# Patient Record
Sex: MALE | Race: WHITE | Employment: OTHER | URBAN - METROPOLITAN AREA
[De-identification: names, ages, dates, MRNs, and addresses within clinical notes are randomized per-mention and may not be internally consistent; named-entity substitution may affect disease eponyms.]

---

## 2021-01-26 ENCOUNTER — HOSPITAL ENCOUNTER (OUTPATIENT)
Dept: PHYSICAL THERAPY | Age: 76
Discharge: HOME | End: 2021-01-26
Payer: MEDICARE

## 2021-01-26 PROCEDURE — 97162 PT EVAL MOD COMPLEX 30 MIN: CPT

## 2021-01-26 PROCEDURE — 97535 SELF CARE MNGMENT TRAINING: CPT

## 2021-01-26 PROCEDURE — 97140 MANUAL THERAPY 1/> REGIONS: CPT

## 2021-01-26 NOTE — PROGRESS NOTES
PHYSICAL THERAPY - DAILY TREATMENT NOTE    Patient Name: Leonardo Webb        Date: 2021  : 1945   YES Patient  Verified  Visit #:     Insurance: Payor: Praveena Escobedo / Plan: [place] MEDICARE PART A & B / Product Type: Medicare /      In time: 2:50 Out time: 3:35   Total Treatment Time: 45     Medicare Time Tracking (below)   Total Timed Codes (min):  25 1:1 Treatment Time:  25     TREATMENT AREA =  Low back pain [M54.5]    SUBJECTIVE  Pain Level (on 0 to 10 scale):    Medication Changes/New allergies or changes in medical history, any new surgeries or procedures? NO    If yes, update Summary List   Subjective Functional Status/Changes:  []  No changes reported     SEE IE          OBJECTIVE    15 min Manual Therapy: T/S mob, L4 L FRS shivani, DTM para   Rationale:      decrease pain, increase ROM and increase tissue extensibility to improve patient's ability to perform ADLs    10 min Self Care: Prone on elbow chin tuck, t/s Rot, seated t/s ext   Rationale:    increase ROM, increase strength and improve coordination to improve the patients ability to perform ADls      Billed With/As:   [] TE   [] TA   [] Neuro   [x] Self Care Patient Education: [x] Review HEP    [] Progressed/Changed HEP based on:   [] positioning   [] body mechanics   [] transfers   [] heat/ice application    [] other:       min Patient Education:  YES  Reviewed HEP   []  Progressed/Changed HEP based on:         Other Objective/Functional Measures:    SEE IE     Post Treatment Pain Level (on 0 to 10) scale:       ASSESSMENT  Assessment/Changes in Function:     SEE IE     []  See Progress Note/Recertification   Patient will continue to benefit from skilled PT services to modify and progress therapeutic interventions, address functional mobility deficits, address ROM deficits, address strength deficits, analyze and address soft tissue restrictions, analyze and cue movement patterns, analyze and modify body mechanics/ergonomics and assess and modify postural abnormalities to attain remaining goals. Progress toward goals / Updated goals:         PLAN  []  Upgrade activities as tolerated YES Continue plan of care   []  Discharge due to :    []  Other:      Therapist: Ethan Lambert PT, OCS, SCS, CSCS    Date: 1/26/2021 Time: 3:39 PM       No future appointments.

## 2021-01-26 NOTE — PROGRESS NOTES
[facility] PHYSICAL THERAPY  [address],[place], [address] [facility] - Phone: [phone]  Fax: [phone] / [address]  Patient Name: Susie Brooke : 1945   Medical   Diagnosis: Low back pain [M54.5] Treatment Diagnosis: Low back pain [M54.5]   Onset Date: Chronic      Referral Source: Shayan Bañuelos MD Start of Care [facility]): 2021   Prior Hospitalization: See medical history Provider #: [id number]   Prior Level of Function: Pain free golf swing   Comorbidities: CA, DM   Medications: Verified on Patient Summary List   The Plan of Care and following information is based on the information from the initial evaluation.   ===========================================================================================  Assessment / schmitz information:  Susie Brooke is a 70 y.o.  yo male with Dx of Low back pain [M54.5]. He reports chronic Hx of LBP. He currently rates his pain as 8/10 at worst, 2/10 at best, primarily located at R lower lumbar region. He complains of difficulty and increase pain with back swing of his golf swing. Objective Findings:  Lumbar ROM: Flx  = limited by 20%, Ext = limited by 30%, Rot: R = limited by 60%, L = limited by 50%. Manual Muscle Testing: R hip abd is Reduced. Palpation:  L4 L FRS, B iliosacral ant innominate, limite mobility at t6 and 6-7th rib noted. Pt instructed in HEP and will f/u in clinic for PT.  ===========================================================================================  Eval Complexity: History MEDIUM  Complexity : 1-2 comorbidities / personal factors will impact the outcome/ POC ;  Examination  MEDIUM Complexity : 3 Standardized tests and measures addressing body structure, function, activity limitation and / or participation in recreation ; Presentation MEDIUM Complexity : Evolving with changing characteristics ;   Decision Making MEDIUM Complexity : FOTO score of 26-74; Overall Complexity MEDIUM  Problem List: pain affecting function, decrease ROM, decrease strength, impaired gait/ balance, decrease ADL/ functional abilitiies, decrease activity tolerance and decrease flexibility/ joint mobility   Treatment Plan may include any combination of the following: Therapeutic exercise, Therapeutic activities, Neuromuscular re-education, Physical agent/modality, Manual therapy, Patient education, Self Care training and Functional mobility training  Patient / Family readiness to learn indicated by: asking questions  Persons(s) to be included in education: patient (P)  Barriers to Learning/Limitations: None  Measures taken, if barriers to learning:    Patient Goal (s): Decrease pain    Patient self reported health status: good  Rehabilitation Potential: good     Short Term Goals: To be accomplished in  1-2  weeks:  1. Independent with HEP. 2. Decrease max pain 25-50% to assist with playing golf    Long Term Goals: To be accomplished in  3-4  weeks:  1. Decrease max pain 50-75% to assist with playing golf  2. Increase FOTO score to 72% to show functional improvment. 3.  Will rate  >/= +5 on Global Rating of Change and be prepared to DC to HEP. Frequency / Duration:   Patient to be seen  2-3  times per week for 3-4  weeks:  Patient / Caregiver education and instruction: self care and exercises    Therapist Signature: Haim Aguila DPT, OCS, SCS, CSCS Date: 6/60/9575   Certification Period: 1/26/21 - 4/23/21 Time: 3:41 PM   ===========================================================================================  I certify that the above Physical Therapy Services are being furnished while the patient is under my care. I agree with the treatment plan and certify that this therapy is necessary. Physician Signature:        Date:       Time:     Please sign and return to [facility] or you may fax the signed copy to [phone]. Thank you.

## 2021-02-05 ENCOUNTER — HOSPITAL ENCOUNTER (OUTPATIENT)
Dept: PHYSICAL THERAPY | Age: 76
Discharge: HOME | End: 2021-02-05
Payer: MEDICARE

## 2021-02-05 PROCEDURE — 97140 MANUAL THERAPY 1/> REGIONS: CPT

## 2021-02-05 NOTE — PROGRESS NOTES
PHYSICAL THERAPY - DAILY TREATMENT NOTE    Patient Name: Kelly Barry        Date: 2021  : 1945   yes Patient  Verified  Visit #:   2   of   12  Insurance: Payor: Deann Hunter / Plan: [place] MEDICARE PART A & B / Product Type: Medicare /      In time: 9:15 Out time: 10:00   Total Treatment Time: 45     Medicare/BCBS Time Tracking (below)   Total Timed Codes (min):  45 1:1 Treatment Time:  45     TREATMENT AREA =  Low back pain [M54.5]  SUBJECTIVE  Pain Level (on 0 to 10 scale):  0  / 10   Medication Changes/New allergies or changes in medical history, any new surgeries or procedures? yes  If yes, update Summary List   Subjective Functional Status/Changes:  []  No changes reported     Its ok today. I felt like I gained the ROM but it didn't stay. I did HEP 2-3x/wk          OBJECTIVE  5 min Therapeutic Exercise:  [x]  See flow sheet   Rationale:      increase ROM, increase strength and improve coordination to improve the patients ability to perform ADLs     40 min Manual Therapy: T/S mob, L4 L FRS shivani, DTM para   Rationale:      decrease pain, increase ROM and increase tissue extensibility to improve patient's ability to perform ADLs     The manual therapy interventions were performed at a separate and distinct time from the therapeutic activities interventions.       Billed With/As:   [] TE   [] TA   [] Neuro   [] Self Care Patient Education: [x] Review HEP    [] Progressed/Changed HEP based on:   [] positioning   [] body mechanics   [] transfers   [] heat/ice application    [] other:      Other Objective/Functional Measures:    Reemphasized the importance of perform HEP daily     Post Treatment Pain Level (on 0 to 10) scale:   0  / 10     ASSESSMENT  Assessment/Changes in Function:     Symmetrical Rot ROM noted after today's Rx     []  See Progress Note/Recertification   Patient will continue to benefit from skilled PT services to modify and progress therapeutic interventions, address functional mobility deficits and address ROM deficits to attain remaining goals. Progress toward goals / Updated goals:     Independent with HEP     PLAN  [x]  Upgrade activities as tolerated yes Continue plan of care   []  Discharge due to :    []  Other:      Therapist: Brayan Garcia, PT    Date: 2/5/2021 Time: 6:50 AM     Future Appointments   Date Time Provider Sandro Edgar   2/5/2021  9:15 AM Teresa Beckwith, PT [id number]

## 2021-02-19 ENCOUNTER — HOSPITAL ENCOUNTER (OUTPATIENT)
Dept: PHYSICAL THERAPY | Age: 76
Discharge: HOME | End: 2021-02-19
Payer: MEDICARE

## 2021-02-19 PROCEDURE — 97140 MANUAL THERAPY 1/> REGIONS: CPT

## 2021-02-19 NOTE — PROGRESS NOTES
PHYSICAL THERAPY - DAILY TREATMENT NOTE    Patient Name: Dany Knight        Date: 2021  : 1945   yes Patient  Verified  Visit #:   3   of   12  Insurance: Payor: Tanya Cordero / Plan: [place] MEDICARE PART A & B / Product Type: Medicare /      In time: 3:00 Out time: 3:45   Total Treatment Time: 45     Medicare/BCBS Time Tracking (below)   Total Timed Codes (min):  45 1:1 Treatment Time:  45     TREATMENT AREA =  Low back pain [M54.5]  SUBJECTIVE  Pain Level (on 0 to 10 scale):  0  / 10   Medication Changes/New allergies or changes in medical history, any new surgeries or procedures?    no  If yes, update Summary List   Subjective Functional Status/Changes:  []  No changes reported     No new c/o          OBJECTIVE  5 min Therapeutic Exercise:  [x]? See flow sheet   Rationale:      increase ROM, increase strength and improve coordination to improve the patients ability to perform ADLs      40 min Manual Therapy: T/S mob, L4 L FRS shivani, DTM para   Rationale:      decrease pain, increase ROM and increase tissue extensibility to improve patient's ability to perform ADLs     The manual therapy interventions were performed at a separate and distinct time from the therapeutic activities interventions.       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    Progress toward goals / Updated goals:    Slow progress with mobility      PLAN  [x]  Upgrade activities as tolerated yes Continue plan of care   []  Discharge due to :    []  Other:      Therapist: Bibi Leonard PT    Date: 2/19/2021 Time: 6:54 AM     Future Appointments   Date Time Provider Sandro Edgar   2/19/2021  3:00 PM Erika Reed, PT [facility]   2/26/2021  3:00 PM Erika Reed, PT [facility]   3/10/2021 10:45 AM Erika Reed, PT [address]   3/12/2021 10:00 AM Erika Reed, PT [address]   3/15/2021 10:00 AM Erika Reed, PT [address]   3/17/2021 10:00 AM Erika Reed PT [address]   3/22/2021 10:00 AM Erika Reed, PT [facility]   3/24/2021 10:00 AM Erika Reed, PT [address]

## 2021-02-26 ENCOUNTER — HOSPITAL ENCOUNTER (OUTPATIENT)
Dept: PHYSICAL THERAPY | Age: 76
Discharge: HOME | End: 2021-02-26
Payer: MEDICARE

## 2021-02-26 PROCEDURE — 97110 THERAPEUTIC EXERCISES: CPT

## 2021-02-26 PROCEDURE — 97140 MANUAL THERAPY 1/> REGIONS: CPT

## 2021-02-26 NOTE — PROGRESS NOTES
PHYSICAL THERAPY - DAILY TREATMENT NOTE    Patient Name: Johanna Simpson        Date: 2021  : 1945   yes Patient  Verified  Visit #:   4   of   12  Insurance: Payor: Javed Worthington / Plan: [place] MEDICARE PART A & B / Product Type: Medicare /      In time: 3:00 Out time: 3:45   Total Treatment Time: 45     Medicare/BCBS Time Tracking (below)   Total Timed Codes (min):  45 1:1 Treatment Time:  45     TREATMENT AREA =  Low back pain [M54.5]  SUBJECTIVE  Pain Level (on 0 to 10 scale):  0  / 10   Medication Changes/New allergies or changes in medical history, any new surgeries or procedures?    no  If yes, update Summary List   Subjective Functional Status/Changes:  []  No changes reported     I played golf last week. I feel like my mobility is a little better, but my back still hurt after           OBJECTIVE  10 min Therapeutic Exercise:  [x]? ?  See flow sheet   Rationale:      increase ROM, increase strength and improve coordination to improve the patients ability to perform ADLs      35 min Manual Therapy: T/S mob, L4 L FRS shivani, DTM para,L IS antinn shivani   Rationale:      decrease pain, increase ROM and increase tissue extensibility to improve patient's ability to perform ADLs     The manual therapy interventions were performed at a separate and distinct time from the therapeutic activities interventions.       Billed With/As:   [] TE   [] TA   [] Neuro   [] Self Care Patient Education: [x] Review HEP    [] Progressed/Changed HEP based on:   [] positioning   [] body mechanics   [] transfers   [] heat/ice application    [] other:      Other Objective/Functional Measures:    B Ant inn noted (L>R)     Post Treatment Pain Level (on 0 to 10) scale:   0  / 10     ASSESSMENT  Assessment/Changes in Function:     Difficulty loading R hip with TK MB toss     []  See Progress Note/Recertification   Patient will continue to benefit from skilled PT services to modify and progress therapeutic interventions, address functional mobility deficits and address ROM deficits to attain remaining goals.    Progress toward goals / Updated goals:    Progressing slowly with pain reduciton      PLAN  [x]  Upgrade activities as tolerated yes Continue plan of care   []  Discharge due to :    []  Other:      Therapist: Lex Carvalho PT    Date: 2/26/2021 Time: 4:01 PM     Future Appointments   Date Time Provider Sandro Edgar   3/10/2021 10:45 AM Judith Sousa, PT [address]   3/12/2021 10:00 AM Judith Sousa, PT [address]   3/15/2021 10:00 AM Judith Sousa, PT [address]   3/17/2021 10:00 AM Judith Sousa, PT [address]   3/22/2021 10:00 AM Judith Sousa, PT [address]   3/24/2021 10:00 AM Judith Sousa, PT [address]

## 2021-03-01 NOTE — PROGRESS NOTES
[facility] PHYSICAL THERAPY   [facility] [address],[place], [address] [facility] - Phone: [phone]  Fax: [phone] PHYSICAL THERAPY          Patient Name: Susanne Chaudhry : 1945   Treatment/Medical Diagnosis: Low back pain [M54.5]   Onset Date: chrpnic    Referral Source: Sun Gonzales MD Start of [facility]): 21   Prior Hospitalization: See Medical History Provider #: [id number]   Prior Level of Function: Pain free golf swing   Comorbidities: CA, DM   Medications: Verified on Patient Summary List   Visits from [facility]: 4 Missed Visits: 0     Goal/Measure of Progress Goal Met? 1. Decrease max pain by 50-75% to assist with ADLs   Status at last Eval: 8/10 Current Status: 5-6/10 Progressing    2. Increase FOTO score to 72% to show functional improvment   Status at last Eval: 67 Current Status: na n/a   3. Will rate >/= +5 on Global Rating of Change and be prepared to DC to HEP. Status at last Eval: na Current Status: na n/a     Key Functional Changes/Progress: Mr. Greer Stewart has had a good tolerance to PT treatment. He  Has only attended 3 follow up visits in 4 wks. He demonstrates some improvement with his thoracic mobility and now has symmetrical thoracic rotation. However, he continues to present with kyphotic posture and anterior innominate of L pelvis, and complain of pain at the end range of back swing when playing golf.     Problem List: pain affecting function, decrease ROM, decrease strength, impaired gait/ balance, decrease ADL/ functional abilitiies, decrease activity tolerance and decrease flexibility/ joint mobility   Treatment Plan may include any combination of the following: Therapeutic exercise, Therapeutic activities, Neuromuscular re-education, Physical agent/modality, Manual therapy, Patient education, Self Care training and Functional mobility training  Patient Goal(s) has been updated and includes:      Goals for this certification period include and are to be achieved in   4  weeks:  Cont with all goals above  Frequency / Duration:   Patient to be seen   2   times per week for   4    weeks:    Assessments/Recommendations: cont with POC    If you have any questions/comments please contact us directly at [phone]. Thank you for allowing us to assist in the care of your patient. Therapist Signature: Joey Bryan PT Date: 0/62/1544   Certification Period:  Reporting Period: 1/26/21 - 4/23/21 1/26/21 - 2/26/21 Time: 6:59 AM   NOTE TO PHYSICIAN:  PLEASE COMPLETE THE ORDERS BELOW AND FAX TO   [facility]: [phone]  If you are unable to process this request in 24 hours please contact our office: [phone]    ___ I have read the above report and request that my patient continue as recommended.   ___ I have read the above report and request that my patient continue therapy with the following changes/special instructions: ________________________________________________   ___ I have read the above report and request that my patient be discharged from therapy.      Physician Signature:        Date:       Time:

## 2021-03-10 ENCOUNTER — HOSPITAL ENCOUNTER (OUTPATIENT)
Dept: PHYSICAL THERAPY | Age: 76
Discharge: HOME | End: 2021-03-10
Payer: MEDICARE

## 2021-03-10 PROCEDURE — 97110 THERAPEUTIC EXERCISES: CPT

## 2021-03-10 PROCEDURE — 97140 MANUAL THERAPY 1/> REGIONS: CPT

## 2021-03-10 NOTE — PROGRESS NOTES
PHYSICAL THERAPY - DAILY TREATMENT NOTE    Patient Name: Drew Martins        Date: 3/10/2021  : 1945   yes Patient  Verified  Visit #:      12  Insurance: Payor: Len Loomis / Plan: [place] MEDICARE PART A & B / Product Type: Medicare /      In time: 10:45 Out time: 11:40   Total Treatment Time: 55     Medicare/BCBS Time Tracking (below)   Total Timed Codes (min):  55 1:1 Treatment Time:  55     TREATMENT AREA =  Low back pain [M54.5]  SUBJECTIVE  Pain Level (on 0 to 10 scale):  0  / 10   Medication Changes/New allergies or changes in medical history, any new surgeries or procedures?    no  If yes, update Summary List   Subjective Functional Status/Changes:  []  No changes reported     I think what we are doing is working. I have less pain swinging and Im driving the ball further. OBJECTIVE  25 min Therapeutic Exercise:  [x]? ??  See flow sheet   Rationale:      increase ROM, increase strength and improve coordination to improve the patients ability to perform ADLs      30 min Manual Therapy: T/S mob, R IS ant inn shivani, DTM para,R TC mob   Rationale:      decrease pain, increase ROM and increase tissue extensibility to improve patient's ability to perform ADLs     The manual therapy interventions were performed at a separate and distinct time from the therapeutic activities interventions.       Billed With/As:   [] TE   [] TA   [] Neuro   [] Self Care Patient Education: [x] Review HEP    [] Progressed/Changed HEP based on:   [] positioning   [] body mechanics   [] transfers   [] heat/ice application    [] other:      Other Objective/Functional Measures:    Cont to have limited upper t/s mobility on R     Post Treatment Pain Level (on 0 to 10) scale:   0  / 10     ASSESSMENT  Assessment/Changes in Function:     Improving loading of R hip with med ball toss     []  See Progress Note/Recertification   Patient will continue to benefit from skilled PT services to modify and progress therapeutic interventions, address functional mobility deficits and address ROM deficits to attain remaining goals.    Progress toward goals / Updated goals:    Progressing well with pain reduction      PLAN  [x]  Upgrade activities as tolerated yes Continue plan of care   []  Discharge due to :    []  Other:      Therapist: Michael Caro PT    Date: 3/10/2021 Time: 6:53 AM     Future Appointments   Date Time Provider Sandro Edgar   3/10/2021 10:45 AM Calos Jaime, PT [address]   3/12/2021 10:00 AM Calos Jaime, PT [address]   3/15/2021 10:00 AM Calos Jaime, PT [address]   3/17/2021 10:00 AM Calos Jaime, PT [address]   3/22/2021 10:00 AM Calos Jaime, PT [address]   3/24/2021 10:00 AM Calos Jiame, PT [address]

## 2021-03-12 ENCOUNTER — HOSPITAL ENCOUNTER (OUTPATIENT)
Dept: PHYSICAL THERAPY | Age: 76
Discharge: HOME | End: 2021-03-12
Payer: MEDICARE

## 2021-03-12 PROCEDURE — 97140 MANUAL THERAPY 1/> REGIONS: CPT

## 2021-03-12 PROCEDURE — 97110 THERAPEUTIC EXERCISES: CPT

## 2021-03-12 NOTE — PROGRESS NOTES
PHYSICAL THERAPY - DAILY TREATMENT NOTE    Patient Name: Enrique Johnson        Date: 3/12/2021  : 1945   yes Patient  Verified  Visit #:     Insurance: Payor: Dai Yang / Plan: [place] MEDICARE PART A & B / Product Type: Medicare /      In time: 9:55 Out time: 10:40   Total Treatment Time: 45     Medicare/BCBS Time Tracking (below)   Total Timed Codes (min):  45 1:1 Treatment Time:  45     TREATMENT AREA =  Low back pain [M54.5]  SUBJECTIVE  Pain Level (on 0 to 10 scale):  0  / 10   Medication Changes/New allergies or changes in medical history, any new surgeries or procedures?    no  If yes, update Summary List   Subjective Functional Status/Changes:  []  No changes reported     Still 6/10 at the worst after playing golf but its definitely doing better. OBJECTIVE  25 min Therapeutic Exercise:  [x]? ???  See flow sheet   Rationale:      increase ROM, increase strength and improve coordination to improve the patients ability to perform ADLs      20 min Manual Therapy: T/S mob, B IS ant inn shivani, DTM para,R TC mob, cont/relax Hip flx and abd   Rationale:      decrease pain, increase ROM and increase tissue extensibility to improve patient's ability to perform ADLs     The manual therapy interventions were performed at a separate and distinct time from the therapeutic activities interventions.         Billed With/As:   [] TE   [] TA   [] Neuro   [] Self Care Patient Education: [x] Review HEP    [] Progressed/Changed HEP based on:   [] positioning   [] body mechanics   [] transfers   [] heat/ice application    [] other:      Other Objective/Functional Measures:    FOTO = 69  GROC = +6     Post Treatment Pain Level (on 0 to 10) scale:   0  / 10     ASSESSMENT  Assessment/Changes in Function:     Improved hip rot noted with MB toss  Sig difficulty with DNS start     []  See Progress Note/Recertification   Patient will continue to benefit from skilled PT services to modify and progress therapeutic interventions, address functional mobility deficits and address ROM deficits to attain remaining goals.   Progress toward goals / Updated goals:    Progressing with pain reduction and function      PLAN  [x]  Upgrade activities as tolerated yes Continue plan of care   []  Discharge due to :    []  Other:      Therapist: Nova Mack PT    Date: 3/12/2021 Time: 6:52 AM     Future Appointments   Date Time Provider Department Center   3/12/2021 10:00 AM Nova Mack PT [facility]   3/15/2021 10:00 AM Nova Mack PT [facility]   3/17/2021 10:00 AM Nova Mack PT [facility]   3/22/2021 10:00 [facility]   3/24/2021 10:00 AM [facility]

## 2021-03-15 ENCOUNTER — HOSPITAL ENCOUNTER (OUTPATIENT)
Dept: PHYSICAL THERAPY | Age: 76
Discharge: HOME | End: 2021-03-15
Payer: MEDICARE

## 2021-03-15 PROCEDURE — 97110 THERAPEUTIC EXERCISES: CPT

## 2021-03-15 PROCEDURE — 97140 MANUAL THERAPY 1/> REGIONS: CPT

## 2021-03-15 NOTE — PROGRESS NOTES
PHYSICAL THERAPY - DAILY TREATMENT NOTE    Patient Name: Leonardo Webb        Date: 3/15/2021  : 1945   yes Patient  Verified  Visit #:     Insurance: Payor: Praveena Escobedo / Plan: [place] MED[place]ARE PART A & B / Product Type: Medicare /      In time: 10:00 Out time: 10:45   Total Treatment Time: 45     Medicare/BCBS Time Tracking (below)   Total Timed Codes (min):  45 1:1 Treatment Time:  45     TREATMENT AREA =  Low back pain [M54.5]  SUBJECTIVE  Pain Level (on 0 to 10 scale):  0  / 10   Medication Changes/New allergies or changes in medical history, any new surgeries or procedures?    no  If yes, update Summary List   Subjective Functional Status/Changes:  []  No changes reported      its getting better. Im doing HEP 3x/wk          OBJECTIVE  25 min Therapeutic Exercise:  [x]? ????  See flow sheet   Rationale:      increase ROM, increase strength and improve coordination to improve the patients ability to perform ADLs      20 min Manual Therapy: T/S mob, B IS ant inn shivani, DTM para,R TC mob, cont/relax Hip flx and abd   Rationale:      decrease pain, increase ROM and increase tissue extensibility to improve patient's ability to perform ADLs     The manual therapy interventions were performed at a separate and distinct time from the therapeutic activities interventions.       Billed With/As:   [] TE   [] TA   [] Neuro   [] Self Care Patient Education: [x] Review HEP    [] Progressed/Changed HEP based on:   [] positioning   [] body mechanics   [] transfers   [] heat/ice application    [] other:      Other Objective/Functional Measures:    Discussed performing HEP 3x/Day to further improve his mobility      Post Treatment Pain Level (on 0 to 10) scale:   0  / 10     ASSESSMENT  Assessment/Changes in Function:     Improved weight shift to R hip      []  See Progress Note/Recertification   Patient will continue to benefit from skilled PT services to modify and progress therapeutic interventions, address functional mobility deficits and address ROM deficits to attain remaining goals.    Progress toward goals / Updated goals:    Progressing well with pain reduction      PLAN  [x]  Upgrade activities as tolerated yes Continue plan of care   []  Discharge due to :    []  Other:      Therapist: Ta Johnson PT    Date: 3/15/2021 Time: 6:55 AM     Future Appointments   Date Time Provider Sandro Edgar   3/15/2021 10:00 AM Vonda Redd, PT [address]   3/17/2021 10:00 AM Vonda Perryler, PT [address]   3/22/2021 10:00 AM Vonda Kindler, PT [address]   3/24/2021 10:00 AM Vonda Perryler, PT [address]   4/2/2021 10:00 AM Vonda Perryler, PT [facility]   4/6/2021  3:45 PM Vonda Rainey, PT [facility]   4/9/2021 10:00 AM Vonda Redd, PT [facility]   4/13/2021  3:45 PM Vonda Redd, PT [facility]   4/16/2021 10:00 AM Vonda Redd, PT [facility]   4/20/2021  3:45 PM Vonda Redd, PT [facility]   4/23/2021 10:00 AM Vonda Perryler, PT [facility]   4/27/2021  3:45 PM Vonda Redd, PT [facility]   4/30/2021 10:00 AM Vonda Redd, PT [address]

## 2021-03-17 ENCOUNTER — HOSPITAL ENCOUNTER (OUTPATIENT)
Dept: PHYSICAL THERAPY | Age: 76
Discharge: HOME | End: 2021-03-17
Payer: MEDICARE

## 2021-03-17 PROCEDURE — 97110 THERAPEUTIC EXERCISES: CPT

## 2021-03-17 PROCEDURE — 97140 MANUAL THERAPY 1/> REGIONS: CPT

## 2021-03-22 ENCOUNTER — HOSPITAL ENCOUNTER (OUTPATIENT)
Dept: PHYSICAL THERAPY | Age: 76
Discharge: HOME | End: 2021-03-22
Payer: MEDICARE

## 2021-03-22 PROCEDURE — 97140 MANUAL THERAPY 1/> REGIONS: CPT

## 2021-03-22 PROCEDURE — 97110 THERAPEUTIC EXERCISES: CPT

## 2021-03-22 NOTE — PROGRESS NOTES
PHYSICAL THERAPY - DAILY TREATMENT NOTE    Patient Name: Josh Hammonds        Date: 3/22/2021  : 1945   yes Patient  Verified  Visit #:     Insurance: Payor: Ladi Burgessors / Plan: [place] MEDICARE PART A & B / Product Type: Medicare /      In time: 10:00 Out time: 10:45   Total Treatment Time: 45     Medicare/BCBS Time Tracking (below)   Total Timed Codes (min):  45 1:1 Treatment Time:  45     TREATMENT AREA =  Low back pain [M54.5]  SUBJECTIVE  Pain Level (on 0 to 10 scale):  0  / 10   Medication Changes/New allergies or changes in medical history, any new surgeries or procedures?    no  If yes, update Summary List   Subjective Functional Status/Changes:  []  No changes reported     The ROt ROM is clearly better          OBJECTIVE  25 min Therapeutic Exercise:  [x]???????  See flow sheet   Rationale:      increase ROM, increase strength and improve coordination to improve the patients ability to perform ADLs      20 min Manual Therapy: T/S mob, B IS ant inn shivani, DTM para,R TC mob, cont/relax Hip flx and abd   Rationale:      decrease pain, increase ROM and increase tissue extensibility to improve patient's ability to perform ADLs     The manual therapy interventions were performed at a separate and distinct time from the therapeutic activities interventions.       Billed With/As:   [] TE   [] TA   [] Neuro   [] Self Care Patient Education: [x] Review HEP    [] Progressed/Changed HEP based on:   [] positioning   [] body mechanics   [] transfers   [] heat/ice application    [] other:      Other Objective/Functional Measures:    Cont to have increaesd soft tissue tension noted at L hip flexors     Post Treatment Pain Level (on 0 to 10) scale:   0  / 10     ASSESSMENT  Assessment/Changes in Function:     Improved post weight shift with golf stance     []  See Progress Note/Recertification   Patient will continue to benefit from skilled PT services to modify and progress therapeutic interventions, address functional mobility deficits and address ROM deficits to attain remaining goals.    Progress toward goals / Updated goals:    Progressing slowly with ROM      PLAN  [x]  Upgrade activities as tolerated yes Continue plan of care   []  Discharge due to :    []  Other:      Therapist: Edgardo Danielson PT    Date: 3/22/2021 Time: 8:42 AM     Future Appointments   Date Time Provider Sandro Edgar   3/22/2021 10:00 AM Adam Jose Miguelk, PT [address]   3/24/2021 10:00 AM Adam Hick, PT [address]   4/2/2021 10:00 AM Adam Hick, PT [facility]   4/6/2021  3:45 PM Adam Lee, PT [address]   4/9/2021 10:00 AM Adam Jose Miguelk, PT [facility]   4/13/2021  3:45 PM Adam Lee, PT [address]   4/16/2021 10:00 AM Adam Jose Miguelk, PT [facility]   4/20/2021  3:45 PM Adam Jesus, PT [facility]   4/23/2021 10:00 AM Adam Jesus, PT [facility]   4/27/2021  3:45 PM Adam Jesus, PT [facility]   4/30/2021 10:00 AM Adam Jesus, PT [address]

## 2021-03-24 ENCOUNTER — HOSPITAL ENCOUNTER (OUTPATIENT)
Dept: PHYSICAL THERAPY | Age: 76
Discharge: HOME | End: 2021-03-24
Payer: MEDICARE

## 2021-03-24 PROCEDURE — 97140 MANUAL THERAPY 1/> REGIONS: CPT

## 2021-03-24 PROCEDURE — 97110 THERAPEUTIC EXERCISES: CPT

## 2021-03-24 NOTE — PROGRESS NOTES
[facility] PHYSICAL THERAPY  [address] [place] [address] [address],Sarina [place], [facility] - Phone: [phone]  Fax: [phone] FOR PHYSICAL THERAPY          Patient Name: Eden Flores : 1945   Treatment/Medical Diagnosis: Low back pain [M54.5]   Onset Date: chrpnic     Referral Source: Lois Jade MD [facility] Care [facility] in [place]): 21   Prior Hospitalization: See Medical History Provider #: [id number]   Prior Level of Function: Pain free golf swing   Comorbidities: CA, DM   Medications: Verified on Patient Summary List   Visits from [facility]: 10 Missed Visits: 0               Goal/Measure of Progress Goal Met? 1. Decrease max pain by 50-75% to assist with ADLs   Status at last Eval: 8/10 Current Status: 5-6/10 Progressing    2. Increase FOTO score to 72% to show functional improvment   Status at last Eval: 67 Current Status: 83 yes   3. Will rate >/= +5 on Global Rating of Change and be prepared to DC to HEP. Status at last Eval: na Current Status: +7 yes      Key Functional Changes/Progress: Mr. Gladis Kim has had a good tolerance to PT treatment. He has made a good improvement with his thoracic ROM and reports significant improvement with his golf swing. Although it has improve,  he continues to present with kyphotic posture and complain of pain at after playing golf. He did not return to PT treatment after the 10th visit. Assessments/Recommendations: Discontinue therapy due to lack of attendance or compliance. If you have any questions/comments please contact us directly at [phone]. Thank you for allowing us to assist in the care of your patient.     Therapist Signature: Mala Philippe, PT Date: [id number]   Certification Period:  Reporting Period: 3/26/21 - 21 - 3/26/21 Time: 11:14 AM

## 2021-03-24 NOTE — PROGRESS NOTES
PHYSICAL THERAPY - DAILY TREATMENT NOTE    Patient Name: Enrique Johnson        Date: 3/24/2021  : 1945   yes Patient  Verified  Visit #:   10   of   12  Insurance: Payor: Dai Yang / Plan: [place] MEDICARE PART A & B / Product Type: Medicare /      In time: 10:00 Out time: 10:45   Total Treatment Time: 45     Medicare/BCBS Time Tracking (below)   Total Timed Codes (min):  45 1:1 Treatment Time:  45     TREATMENT AREA =  Low back pain [M54.5]  SUBJECTIVE  Pain Level (on 0 to 10 scale):  2  / 10   Medication Changes/New allergies or changes in medical history, any new surgeries or procedures?    no  If yes, update Summary List   Subjective Functional Status/Changes:  []  No changes reported     Its clearly better. I still get pain after playing golf 5-6/10 at the worst.           OBJECTIVE  25 min Therapeutic Exercise:  [x]????????  See flow sheet   Rationale:      increase ROM, increase strength and improve coordination to improve the patients ability to perform ADLs      20 min Manual Therapy: T/S mob, B IS ant inn shivani, DTM para,R TC mob, cont/relax Hip flx and abd   Rationale:      decrease pain, increase ROM and increase tissue extensibility to improve patient's ability to perform ADLs     The manual therapy interventions were performed at a separate and distinct time from the therapeutic activities interventions.       Billed With/As:   [] TE   [] TA   [] Neuro   [] Self Care Patient Education: [x] Review HEP    [] Progressed/Changed HEP based on:   [] positioning   [] body mechanics   [] transfers   [] heat/ice application    [] other:      Other Objective/Functional Measures:    FOTO = 83  GROC = +7     Post Treatment Pain Level (on 0 to 10) scale:   0  / 10     ASSESSMENT  Assessment/Changes in Function:     Improve hip rot noted with golf swing      []  See Progress Note/Recertification   Patient will continue to benefit from skilled PT services to modify and progress therapeutic interventions, address functional mobility deficits and address ROM deficits to attain remaining goals.    Progress toward goals / Updated goals:    Met LTG #2 and #3     PLAN  [x]  Upgrade activities as tolerated yes Continue plan of care   []  Discharge due to :    []  Other:      Therapist: Ta Johnson PT    Date: 3/24/2021 Time: 6:56 AM     Future Appointments   Date Time Provider Sandro Edgar   3/24/2021 10:00 AM Vonda Rainey, PT [facility]   4/2/2021 10:00 AM Vonda Rainey, PT [facility]   4/6/2021  3:45 PM Vonda Rainey, PT [address]   4/9/2021 10:00 AM Vonda Redd, PT [facility]   4/13/2021  3:45 PM Vonda Rainey, PT [address]   4/16/2021 10:00 AM Vonda Rainey, PT [facility]   4/20/2021  3:45 PM Vonda Rainey, PT [facility]   4/23/2021 10:00 AM Vonda Rainey, PT [facility]   4/27/2021  3:45 PM Vonda Rainey, PT [facility]   4/30/2021 10:00 AM Vonda Rainey, PT [address]

## 2021-04-02 ENCOUNTER — APPOINTMENT (OUTPATIENT)
Dept: PHYSICAL THERAPY | Age: 76
End: 2021-04-02

## 2021-04-06 ENCOUNTER — APPOINTMENT (OUTPATIENT)
Dept: PHYSICAL THERAPY | Age: 76
End: 2021-04-06

## 2021-04-09 ENCOUNTER — APPOINTMENT (OUTPATIENT)
Dept: PHYSICAL THERAPY | Age: 76
End: 2021-04-09

## 2021-04-13 ENCOUNTER — APPOINTMENT (OUTPATIENT)
Dept: PHYSICAL THERAPY | Age: 76
End: 2021-04-13

## 2021-04-16 ENCOUNTER — APPOINTMENT (OUTPATIENT)
Dept: PHYSICAL THERAPY | Age: 76
End: 2021-04-16

## 2021-04-20 ENCOUNTER — APPOINTMENT (OUTPATIENT)
Dept: PHYSICAL THERAPY | Age: 76
End: 2021-04-20

## 2021-04-23 ENCOUNTER — APPOINTMENT (OUTPATIENT)
Dept: PHYSICAL THERAPY | Age: 76
End: 2021-04-23

## 2021-04-27 ENCOUNTER — APPOINTMENT (OUTPATIENT)
Dept: PHYSICAL THERAPY | Age: 76
End: 2021-04-27

## 2021-04-30 ENCOUNTER — APPOINTMENT (OUTPATIENT)
Dept: PHYSICAL THERAPY | Age: 76
End: 2021-04-30